# Patient Record
Sex: MALE | Race: WHITE | URBAN - METROPOLITAN AREA
[De-identification: names, ages, dates, MRNs, and addresses within clinical notes are randomized per-mention and may not be internally consistent; named-entity substitution may affect disease eponyms.]

---

## 2017-10-26 ENCOUNTER — OFFICE VISIT (OUTPATIENT)
Dept: FAMILY MEDICINE CLINIC | Age: 27
End: 2017-10-26
Payer: COMMERCIAL

## 2017-10-26 VITALS
HEIGHT: 73 IN | DIASTOLIC BLOOD PRESSURE: 70 MMHG | BODY MASS INDEX: 25.05 KG/M2 | SYSTOLIC BLOOD PRESSURE: 102 MMHG | HEART RATE: 66 BPM | WEIGHT: 189 LBS

## 2017-10-26 DIAGNOSIS — Z48.02 VISIT FOR SUTURE REMOVAL: ICD-10-CM

## 2017-10-26 DIAGNOSIS — S81.812A LACERATION OF LEFT LOWER EXTREMITY, INITIAL ENCOUNTER: Primary | ICD-10-CM

## 2017-10-26 PROCEDURE — 99213 OFFICE O/P EST LOW 20 MIN: CPT | Performed by: INTERNAL MEDICINE

## 2017-10-26 ASSESSMENT — ENCOUNTER SYMPTOMS
NAUSEA: 0
BLOOD IN STOOL: 0
SORE THROAT: 0
RESPIRATORY NEGATIVE: 1
DIARRHEA: 0
ABDOMINAL PAIN: 0
CONSTIPATION: 0

## 2017-10-26 ASSESSMENT — PATIENT HEALTH QUESTIONNAIRE - PHQ9
SUM OF ALL RESPONSES TO PHQ9 QUESTIONS 1 & 2: 0
SUM OF ALL RESPONSES TO PHQ QUESTIONS 1-9: 0
1. LITTLE INTEREST OR PLEASURE IN DOING THINGS: 0
2. FEELING DOWN, DEPRESSED OR HOPELESS: 0

## 2017-10-26 NOTE — PROGRESS NOTES
1. Laceration of left lower extremity, initial encounter     2. Visit for suture removal             Plan:      1. Laceration of left lower extremity, initial encounter   2. Visit for suture removal.   Sutures removed today with some mild drainage but no signs of infection. Danielle Camarillo was encouraged to watch for signs of infection. Would keep neosporin on the wound daily.

## 2017-10-26 NOTE — PATIENT INSTRUCTIONS
SURVEY:    You may be receiving a survey from [employer] regarding your visit today. Please complete the survey to enable us to provide the highest quality of care to you and your family. If you cannot score us a very good on any question, please call the office to discuss how we could of made your experience a very good one. Thank you. 1. Laceration of left lower extremity, initial encounter  Sutures removed today with some mild drainage but no signs of infection. Vicente Mery was encouraged to watch for signs of infection. Would keep neosporin on the wound daily.

## 2024-02-15 ENCOUNTER — OFFICE VISIT (OUTPATIENT)
Dept: FAMILY MEDICINE CLINIC | Age: 34
End: 2024-02-15
Payer: COMMERCIAL

## 2024-02-15 VITALS
SYSTOLIC BLOOD PRESSURE: 111 MMHG | OXYGEN SATURATION: 97 % | HEIGHT: 73 IN | BODY MASS INDEX: 22 KG/M2 | WEIGHT: 166 LBS | HEART RATE: 75 BPM | DIASTOLIC BLOOD PRESSURE: 72 MMHG

## 2024-02-15 DIAGNOSIS — J10.1 INFLUENZA B: Primary | ICD-10-CM

## 2024-02-15 DIAGNOSIS — R05.1 ACUTE COUGH: ICD-10-CM

## 2024-02-15 LAB
INFLUENZA A ANTIBODY: NORMAL
INFLUENZA B ANTIBODY: NORMAL
KIT LOT NO., HCLOLOT: NORMAL
SARS-COV-2, POC: NORMAL
VALID INTERNAL CONTROL, POC: YES
VENDOR AND KIT NAME POC: NORMAL

## 2024-02-15 PROCEDURE — 87804 INFLUENZA ASSAY W/OPTIC: CPT | Performed by: LICENSED PRACTICAL NURSE

## 2024-02-15 PROCEDURE — 87426 SARSCOV CORONAVIRUS AG IA: CPT | Performed by: LICENSED PRACTICAL NURSE

## 2024-02-15 PROCEDURE — 99213 OFFICE O/P EST LOW 20 MIN: CPT | Performed by: LICENSED PRACTICAL NURSE

## 2024-02-15 RX ORDER — OSELTAMIVIR PHOSPHATE 75 MG/1
75 CAPSULE ORAL 2 TIMES DAILY
Qty: 10 CAPSULE | Refills: 0 | Status: SHIPPED | OUTPATIENT
Start: 2024-02-15 | End: 2024-02-20

## 2024-02-15 NOTE — PATIENT INSTRUCTIONS
Use Tamiflu twice a day for 5 days  Increase fluids  Continue to use OTC cold medications as needed  Good handwashing

## 2024-02-15 NOTE — PROGRESS NOTES
Drake Villagran (:  1990) is a 33 y.o. male,Established patient, here for evaluation of the following chief complaint(s):  Congestion (Symptoms x 1, OTC medication gives little relief) and Generalized Body Aches         ASSESSMENT/PLAN:  1. Influenza B  -     oseltamivir (TAMIFLU) 75 MG capsule; Take 1 capsule by mouth 2 times daily for 5 days, Disp-10 capsule, R-0Normal  2. Acute cough  -     POCT Influenza A/B  -     POC COVID-19    Plan:     1. Influenza B  Treat with Tamiflu for 5 days  Increase fluids  Continue to use OTC cold medications as needed  Good handwashing  Off work slip 2/15/24-24  - oseltamivir (TAMIFLU) 75 MG capsule; Take 1 capsule by mouth 2 times daily for 5 days  Dispense: 10 capsule; Refill: 0    2. Acute cough    - POCT Influenza A/B  - POC COVID-19        Return if symptoms worsen or fail to improve.         Subjective   SUBJECTIVE/OBJECTIVE:  Drake presents today with complaints of body aches, vomiting, diarrhea, nasal congestion and fatigue for 3 days. He has been using OTC cold medications (Mucinex and Nyquill) with some relief. He reports vomiting for the last 2 days and some diarrhea. He was unable to stay at work yesterday. He is unsure if he has a fever because he did not check his temp. He has had a cough.        Review of Systems   Constitutional:  Positive for activity change, appetite change and fatigue.   HENT:  Positive for congestion, rhinorrhea, sinus pressure and sinus pain. Negative for sore throat.    Eyes: Negative.    Respiratory:  Positive for cough.    Cardiovascular:  Negative for chest pain and palpitations.   Gastrointestinal:  Positive for abdominal pain, diarrhea, nausea and vomiting.   Genitourinary:  Negative for difficulty urinating and dysuria.   Musculoskeletal:  Positive for myalgias.   Skin:  Negative for rash.   Neurological:  Positive for headaches.          Objective   Physical Exam  Vitals and nursing note reviewed.   Constitutional: